# Patient Record
Sex: FEMALE | Race: WHITE | NOT HISPANIC OR LATINO
[De-identification: names, ages, dates, MRNs, and addresses within clinical notes are randomized per-mention and may not be internally consistent; named-entity substitution may affect disease eponyms.]

---

## 2017-04-26 ENCOUNTER — APPOINTMENT (OUTPATIENT)
Dept: ENDOCRINOLOGY | Facility: CLINIC | Age: 72
End: 2017-04-26

## 2017-04-26 VITALS — SYSTOLIC BLOOD PRESSURE: 110 MMHG | OXYGEN SATURATION: 98 % | HEART RATE: 74 BPM | DIASTOLIC BLOOD PRESSURE: 64 MMHG

## 2017-07-17 ENCOUNTER — APPOINTMENT (OUTPATIENT)
Dept: ENDOCRINOLOGY | Facility: CLINIC | Age: 72
End: 2017-07-17

## 2017-07-17 VITALS
HEIGHT: 60 IN | HEART RATE: 83 BPM | WEIGHT: 128 LBS | DIASTOLIC BLOOD PRESSURE: 72 MMHG | BODY MASS INDEX: 25.13 KG/M2 | SYSTOLIC BLOOD PRESSURE: 118 MMHG | OXYGEN SATURATION: 98 %

## 2017-11-13 ENCOUNTER — APPOINTMENT (OUTPATIENT)
Dept: OBGYN | Facility: CLINIC | Age: 72
End: 2017-11-13
Payer: MEDICARE

## 2017-11-13 ENCOUNTER — RESULT REVIEW (OUTPATIENT)
Age: 72
End: 2017-11-13

## 2017-11-13 PROCEDURE — G0101: CPT

## 2018-04-27 ENCOUNTER — APPOINTMENT (OUTPATIENT)
Dept: ENDOCRINOLOGY | Facility: CLINIC | Age: 73
End: 2018-04-27
Payer: MEDICARE

## 2018-04-27 VITALS
WEIGHT: 126 LBS | HEART RATE: 74 BPM | BODY MASS INDEX: 24.1 KG/M2 | HEIGHT: 60.5 IN | DIASTOLIC BLOOD PRESSURE: 64 MMHG | SYSTOLIC BLOOD PRESSURE: 90 MMHG | OXYGEN SATURATION: 98 %

## 2018-04-27 PROCEDURE — ZZZZZ: CPT

## 2018-04-27 PROCEDURE — 99214 OFFICE O/P EST MOD 30 MIN: CPT | Mod: 25

## 2018-04-27 PROCEDURE — 77080 DXA BONE DENSITY AXIAL: CPT | Mod: GA

## 2018-04-27 RX ORDER — RANITIDINE HYDROCHLORIDE 300 MG/1
300 CAPSULE ORAL
Qty: 30 | Refills: 0 | Status: COMPLETED | COMMUNITY
Start: 2018-02-09

## 2018-05-23 ENCOUNTER — APPOINTMENT (OUTPATIENT)
Dept: RHEUMATOLOGY | Facility: CLINIC | Age: 73
End: 2018-05-23
Payer: MEDICARE

## 2018-05-23 PROCEDURE — 96374 THER/PROPH/DIAG INJ IV PUSH: CPT

## 2018-11-20 ENCOUNTER — APPOINTMENT (OUTPATIENT)
Dept: OBGYN | Facility: CLINIC | Age: 73
End: 2018-11-20
Payer: MEDICARE

## 2018-11-20 PROCEDURE — 99213 OFFICE O/P EST LOW 20 MIN: CPT

## 2019-04-29 ENCOUNTER — APPOINTMENT (OUTPATIENT)
Dept: ENDOCRINOLOGY | Facility: CLINIC | Age: 74
End: 2019-04-29
Payer: MEDICARE

## 2019-04-29 VITALS
OXYGEN SATURATION: 98 % | SYSTOLIC BLOOD PRESSURE: 106 MMHG | BODY MASS INDEX: 24.8 KG/M2 | HEIGHT: 60.25 IN | HEART RATE: 69 BPM | DIASTOLIC BLOOD PRESSURE: 66 MMHG | WEIGHT: 128 LBS

## 2019-04-29 PROCEDURE — 99214 OFFICE O/P EST MOD 30 MIN: CPT | Mod: 25

## 2019-04-29 PROCEDURE — 77080 DXA BONE DENSITY AXIAL: CPT | Mod: GA

## 2019-04-29 PROCEDURE — ZZZZZ: CPT

## 2019-04-29 NOTE — REVIEW OF SYSTEMS
[Heartburn] : heartburn [Negative] : Heme/Lymph [All other systems negative] : All other systems negative [Palpitations] : palpitations [Shortness Of Breath] : shortness of breath

## 2019-04-30 NOTE — ASSESSMENT
[FreeTextEntry1] : 74 y/o female with osteoporosis. \par \par Pt had progressive bone loss on 5 year drug holiday from Actonel. Pt was restarted treatment on IV Reclast due to severe GERD (first in 3/2016). Pt tolerated this well. No ONJ. No thigh pain. No interval fx. BMD 2018 decreased fem neck. Options of medical therapy were discussed in great detail. Had another dose of Reclast May 2018. BMD 4/2019 indicates stability in all sites, fem neck consistent with osteoporosis.\par \par I recommend pt have another dose of Reclast and consider a drug holiday after this. Recent research with Reclast looked at 18 month cycles of dosing instead of 12 months, with 6 year safety data. All questions answered. Referred to rheum. \par \par Of note, pt had cough and cold took Cefdinir 300 mg on own. Pt then felt palpations, trouble breathing, and tightness in chest. Normal echo, carotid US, halter, EKG. If she wishes to do further w/u she can see pulm. I do not see value in second cardiology w/u. Recommended Dr. Dioni Chin. \par Pt will have labs done in 2 weeks. \par \par f/u in 1 year.  [Bisphosphonate Therapy] : Risks  and benefits of bisphosphonate therapy were  discussed with the patient including gastroesophageal irritation, osteonecrosis of the jaw, and atypical femur fractures, and acute phase reaction

## 2019-04-30 NOTE — PROCEDURE
[FreeTextEntry1] : Bone mineral density: 04/29/2019 \par Indication: vs. 2018, assess response to medication \par Spine: not done\par Total hip: -2.1 osteopenia, no significant change \par Femoral neck: -3.0 osteoporosis, no significant change \par Proximal radius: -2.4 osteopenia, no significant change \par \par Bone mineral density 4/27/18\par indication: assess response to medication \par spine Not accurate because of arthritis \par total hip -2.0 osteopenia, no significant change \par femoral neck -2.9 osteoporosis -9.2%\par proximal radius  -2.2 osteopenia, no significant change

## 2019-04-30 NOTE — HISTORY OF PRESENT ILLNESS
[Risedronate (Actonel)] : Risedronate [Zoledronic Acid (Zometa)] : Zoledronic Acid [Patient taking Meds Correctly] : Patient is taking meds correctly [Family History of Osteoporosis] : family history of osteoporosis [Family History of Hip Fracture] : family history of hip fracture [Regular Dental Follow-Up] : regular dental follow-up [FreeTextEntry1] : f/u 72 y/o female with osteoporosis. \par \par Pt was treated with Actonel for about 10 years and stopped 2011 for a drug holiday. BMD 4/2017 showed spine -1.9, tot hip -2.0, and fem neck -2.4, which improved from prior. The pt has not had any fx but does have a h/o maternal hip fx. She has active GERD and prior gastric surgery and is unwilling to restart standard oral agents such as Actonel. Had EGD. s/p 2 doses of Reclast 3/2016 and 2018. Tolerated well. No interval fxs. Last DDS within 6 mons. No ONJ. BMD 2018 sl decrease in fem neck put stable in other sites. \par  \par \par  [Disordered Eating] : no past or present history of disordered eating [Taking Steroids] : no past or present history of taking steroids [Kidney Stones] : no history of kidney stones [High Fall Risk] : no fall risk [Family History of Breast Cancer] : no family history of breast cancer [Hyperparathyroidism] : no hyperparathyroidism [History of Radiation Therapy] : no history of radiation therapy [History of Blood Clots] : no history of blood clots [Previous Fragility Fracture] : no previous fragility fracture

## 2019-04-30 NOTE — END OF VISIT
[FreeTextEntry3] : I, Sylvia Garcia, authored this note working as a medical scribe for Dr. Latham.  04/29/2019. 11:30AM. \par This note was authored by Sylvia Garcia working as medical scribe for me. I have reviewed, edited, and revised the note as needed. I am in agreement with the exam findings, imaging findings, and treatment plan.  Maurice Latham MD

## 2019-04-30 NOTE — PHYSICAL EXAM
[Alert] : alert [No Acute Distress] : no acute distress [Well Nourished] : well nourished [Well Developed] : well developed [Normal Sclera/Conjunctiva] : normal sclera/conjunctiva [EOMI] : extra ocular movement intact [No Proptosis] : no proptosis [Normal Oropharynx] : the oropharynx was normal [Thyroid Not Enlarged] : the thyroid was not enlarged [No Thyroid Nodules] : there were no palpable thyroid nodules [No Respiratory Distress] : no respiratory distress [No Accessory Muscle Use] : no accessory muscle use [Clear to Auscultation] : lungs were clear to auscultation bilaterally [Normal Rate] : heart rate was normal  [Normal S1, S2] : normal S1 and S2 [Regular Rhythm] : with a regular rhythm [Normal Bowel Sounds] : normal bowel sounds [Not Tender] : non-tender [Soft] : abdomen soft [Not Distended] : not distended [Anterior Cervical Nodes] : anterior cervical nodes [Normal] : normal and non tender [No Spinal Tenderness] : no spinal tenderness [Spine Straight] : spine straight [No Stigmata of Cushings Syndrome] : no stigmata of cushings syndrome [Normal Gait] : normal gait [Normal Strength/Tone] : muscle strength and tone were normal [No Rash] : no rash [Normal Reflexes] : deep tendon reflexes were 2+ and symmetric [No Tremors] : no tremors [Oriented x3] : oriented to person, place, and time [Acanthosis Nigricans] : no acanthosis nigricans [de-identified] : no tenderness of spine

## 2019-07-24 ENCOUNTER — APPOINTMENT (OUTPATIENT)
Dept: RHEUMATOLOGY | Facility: CLINIC | Age: 74
End: 2019-07-24
Payer: MEDICARE

## 2019-07-24 PROCEDURE — 96374 THER/PROPH/DIAG INJ IV PUSH: CPT

## 2019-08-12 ENCOUNTER — APPOINTMENT (OUTPATIENT)
Dept: PULMONOLOGY | Facility: CLINIC | Age: 74
End: 2019-08-12
Payer: MEDICARE

## 2019-08-12 VITALS
SYSTOLIC BLOOD PRESSURE: 125 MMHG | HEIGHT: 60 IN | DIASTOLIC BLOOD PRESSURE: 66 MMHG | BODY MASS INDEX: 25.13 KG/M2 | OXYGEN SATURATION: 100 % | HEART RATE: 67 BPM | WEIGHT: 128 LBS | RESPIRATION RATE: 18 BRPM

## 2019-08-12 DIAGNOSIS — J32.9 CHRONIC SINUSITIS, UNSPECIFIED: ICD-10-CM

## 2019-08-12 DIAGNOSIS — K21.9 GASTRO-ESOPHAGEAL REFLUX DISEASE W/OUT ESOPHAGITIS: ICD-10-CM

## 2019-08-12 PROCEDURE — 99204 OFFICE O/P NEW MOD 45 MIN: CPT | Mod: 25

## 2019-08-12 PROCEDURE — 71046 X-RAY EXAM CHEST 2 VIEWS: CPT

## 2019-08-12 PROCEDURE — 95012 NITRIC OXIDE EXP GAS DETER: CPT

## 2019-08-12 PROCEDURE — 94729 DIFFUSING CAPACITY: CPT

## 2019-08-12 PROCEDURE — 94727 GAS DIL/WSHOT DETER LNG VOL: CPT

## 2019-08-12 PROCEDURE — 94060 EVALUATION OF WHEEZING: CPT

## 2019-08-12 RX ORDER — ZOLEDRONIC ACID 5 MG/100ML
5 INJECTION, SOLUTION INTRAVENOUS
Qty: 1 | Refills: 0 | Status: DISCONTINUED | COMMUNITY
Start: 2019-04-29 | End: 2019-08-12

## 2019-08-13 NOTE — PHYSICAL EXAM
[General Appearance - Well Developed] : well developed [Normal Appearance] : normal appearance [Well Groomed] : well groomed [General Appearance - Well Nourished] : well nourished [No Deformities] : no deformities [General Appearance - In No Acute Distress] : no acute distress [Normal Conjunctiva] : the conjunctiva exhibited no abnormalities [Eyelids - No Xanthelasma] : the eyelids demonstrated no xanthelasmas [Neck Appearance] : the appearance of the neck was normal [Normal Oropharynx] : normal oropharynx [Neck Cervical Mass (___cm)] : no neck mass was observed [Jugular Venous Distention Increased] : there was no jugular-venous distention [Thyroid Diffuse Enlargement] : the thyroid was not enlarged [Thyroid Nodule] : there were no palpable thyroid nodules [Heart Rate And Rhythm] : heart rate and rhythm were normal [Heart Sounds] : normal S1 and S2 [Murmurs] : no murmurs present [Respiration, Rhythm And Depth] : normal respiratory rhythm and effort [Exaggerated Use Of Accessory Muscles For Inspiration] : no accessory muscle use [Auscultation Breath Sounds / Voice Sounds] : lungs were clear to auscultation bilaterally [Abdomen Soft] : soft [Abdomen Tenderness] : non-tender [Abdomen Mass (___ Cm)] : no abdominal mass palpated [Abnormal Walk] : normal gait [Gait - Sufficient For Exercise Testing] : the gait was sufficient for exercise testing [Nail Clubbing] : no clubbing of the fingernails [Cyanosis, Localized] : no localized cyanosis [Petechial Hemorrhages (___cm)] : no petechial hemorrhages [Skin Color & Pigmentation] : normal skin color and pigmentation [Skin Turgor] : normal skin turgor [] : no rash [Deep Tendon Reflexes (DTR)] : deep tendon reflexes were 2+ and symmetric [Sensation] : the sensory exam was normal to light touch and pinprick [No Focal Deficits] : no focal deficits [Oriented To Time, Place, And Person] : oriented to person, place, and time [Impaired Insight] : insight and judgment were intact [Affect] : the affect was normal

## 2019-08-13 NOTE — HISTORY OF PRESENT ILLNESS
[FreeTextEntry1] : Patient here for evaluation of underlying lung disease. Reports symptoms of prolonged cough and sinus congestion. Took a course of antibiotics with improvement. Subsequently developed palpitations and difficulty catching her breath had cardiac workup there was negative. Has history of intermittent acid reflux. Had a hiatal hernia. Underwent laparoscopic repair. Incomplete resolution of symptoms. 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 On chronic vasodilator therapy\par \par \par \par \par

## 2019-08-13 NOTE — ASSESSMENT
[FreeTextEntry1] : No objective evidence of active pulmonary disease.\par \par Reassessment when symptomatic

## 2019-11-22 ENCOUNTER — APPOINTMENT (OUTPATIENT)
Dept: OBGYN | Facility: CLINIC | Age: 74
End: 2019-11-22
Payer: MEDICARE

## 2019-11-22 ENCOUNTER — RESULT REVIEW (OUTPATIENT)
Age: 74
End: 2019-11-22

## 2019-11-22 PROCEDURE — G0101: CPT

## 2019-12-09 ENCOUNTER — FORM ENCOUNTER (OUTPATIENT)
Age: 74
End: 2019-12-09

## 2019-12-10 ENCOUNTER — APPOINTMENT (OUTPATIENT)
Dept: PULMONOLOGY | Facility: CLINIC | Age: 74
End: 2019-12-10
Payer: MEDICARE

## 2019-12-10 VITALS
HEART RATE: 80 BPM | SYSTOLIC BLOOD PRESSURE: 119 MMHG | OXYGEN SATURATION: 100 % | TEMPERATURE: 97.5 F | DIASTOLIC BLOOD PRESSURE: 73 MMHG

## 2019-12-10 PROCEDURE — 99213 OFFICE O/P EST LOW 20 MIN: CPT | Mod: 25

## 2019-12-10 PROCEDURE — 94060 EVALUATION OF WHEEZING: CPT

## 2019-12-10 PROCEDURE — 95012 NITRIC OXIDE EXP GAS DETER: CPT

## 2019-12-10 NOTE — HISTORY OF PRESENT ILLNESS
[FreeTextEntry1] : Overall feeling good. Occasional SOB. Denies coughing/wheezing. Having a difficult time catching he breath sometimes. \par Had cardiac workup negative\par

## 2019-12-10 NOTE — PHYSICAL EXAM
[General Appearance - Well Developed] : well developed [Normal Appearance] : normal appearance [General Appearance - Well Nourished] : well nourished [Well Groomed] : well groomed [No Deformities] : no deformities [General Appearance - In No Acute Distress] : no acute distress [Normal Conjunctiva] : the conjunctiva exhibited no abnormalities [Eyelids - No Xanthelasma] : the eyelids demonstrated no xanthelasmas [Normal Oropharynx] : normal oropharynx [Neck Appearance] : the appearance of the neck was normal [Neck Cervical Mass (___cm)] : no neck mass was observed [Jugular Venous Distention Increased] : there was no jugular-venous distention [Thyroid Diffuse Enlargement] : the thyroid was not enlarged [Thyroid Nodule] : there were no palpable thyroid nodules [Heart Rate And Rhythm] : heart rate and rhythm were normal [Heart Sounds] : normal S1 and S2 [Murmurs] : no murmurs present [Respiration, Rhythm And Depth] : normal respiratory rhythm and effort [Exaggerated Use Of Accessory Muscles For Inspiration] : no accessory muscle use [Auscultation Breath Sounds / Voice Sounds] : lungs were clear to auscultation bilaterally [Abdomen Soft] : soft [Abdomen Tenderness] : non-tender [Abdomen Mass (___ Cm)] : no abdominal mass palpated [Abnormal Walk] : normal gait [Gait - Sufficient For Exercise Testing] : the gait was sufficient for exercise testing [Cyanosis, Localized] : no localized cyanosis [Nail Clubbing] : no clubbing of the fingernails [Petechial Hemorrhages (___cm)] : no petechial hemorrhages [Deep Tendon Reflexes (DTR)] : deep tendon reflexes were 2+ and symmetric [] : no ischemic changes [Sensation] : the sensory exam was normal to light touch and pinprick [No Focal Deficits] : no focal deficits [Oriented To Time, Place, And Person] : oriented to person, place, and time [Affect] : the affect was normal [Impaired Insight] : insight and judgment were intact

## 2019-12-17 ENCOUNTER — APPOINTMENT (OUTPATIENT)
Dept: PULMONOLOGY | Facility: CLINIC | Age: 74
End: 2019-12-17
Payer: MEDICARE

## 2019-12-17 ENCOUNTER — FORM ENCOUNTER (OUTPATIENT)
Age: 74
End: 2019-12-17

## 2019-12-17 PROCEDURE — 95800 SLP STDY UNATTENDED: CPT

## 2019-12-18 PROCEDURE — 95800 SLP STDY UNATTENDED: CPT

## 2020-01-06 ENCOUNTER — APPOINTMENT (OUTPATIENT)
Dept: PULMONOLOGY | Facility: CLINIC | Age: 75
End: 2020-01-06
Payer: MEDICARE

## 2020-01-06 VITALS
SYSTOLIC BLOOD PRESSURE: 110 MMHG | RESPIRATION RATE: 12 BRPM | DIASTOLIC BLOOD PRESSURE: 71 MMHG | TEMPERATURE: 98.2 F | HEART RATE: 78 BPM | OXYGEN SATURATION: 99 %

## 2020-01-06 DIAGNOSIS — G47.33 OBSTRUCTIVE SLEEP APNEA (ADULT) (PEDIATRIC): ICD-10-CM

## 2020-01-06 DIAGNOSIS — R06.00 DYSPNEA, UNSPECIFIED: ICD-10-CM

## 2020-01-06 PROCEDURE — 99214 OFFICE O/P EST MOD 30 MIN: CPT

## 2020-01-06 NOTE — PHYSICAL EXAM
[General Appearance - Well Developed] : well developed [Normal Appearance] : normal appearance [Well Groomed] : well groomed [General Appearance - Well Nourished] : well nourished [No Deformities] : no deformities [General Appearance - In No Acute Distress] : no acute distress [Normal Conjunctiva] : the conjunctiva exhibited no abnormalities [Eyelids - No Xanthelasma] : the eyelids demonstrated no xanthelasmas [Normal Oropharynx] : normal oropharynx [Neck Appearance] : the appearance of the neck was normal [Neck Cervical Mass (___cm)] : no neck mass was observed [Thyroid Diffuse Enlargement] : the thyroid was not enlarged [Jugular Venous Distention Increased] : there was no jugular-venous distention [Thyroid Nodule] : there were no palpable thyroid nodules [Heart Sounds] : normal S1 and S2 [Heart Rate And Rhythm] : heart rate and rhythm were normal [Murmurs] : no murmurs present [Respiration, Rhythm And Depth] : normal respiratory rhythm and effort [Exaggerated Use Of Accessory Muscles For Inspiration] : no accessory muscle use [Auscultation Breath Sounds / Voice Sounds] : lungs were clear to auscultation bilaterally [Abdomen Soft] : soft [Abdomen Tenderness] : non-tender [Abnormal Walk] : normal gait [Abdomen Mass (___ Cm)] : no abdominal mass palpated [Nail Clubbing] : no clubbing of the fingernails [Gait - Sufficient For Exercise Testing] : the gait was sufficient for exercise testing [Cyanosis, Localized] : no localized cyanosis [Petechial Hemorrhages (___cm)] : no petechial hemorrhages [] : no ischemic changes [Deep Tendon Reflexes (DTR)] : deep tendon reflexes were 2+ and symmetric [Sensation] : the sensory exam was normal to light touch and pinprick [No Focal Deficits] : no focal deficits [Oriented To Time, Place, And Person] : oriented to person, place, and time [Impaired Insight] : insight and judgment were intact [Affect] : the affect was normal

## 2020-01-06 NOTE — ASSESSMENT
[FreeTextEntry1] : Continued shortness of breath unclear etiology\par Recommend ENT evaluation for nasal obstruction\par Start Singulair\par Patient declining treatment for sleep apnea given the mild nature of finding a sleep study this is reasonable

## 2020-01-06 NOTE — HISTORY OF PRESENT ILLNESS
[FreeTextEntry1] : Followup for shortness of breath and sleep study\par underwent home sleep study here to review results\par Continues to have shortness of breath with exertion on an intermittent basis

## 2020-06-22 ENCOUNTER — APPOINTMENT (OUTPATIENT)
Dept: ENDOCRINOLOGY | Facility: CLINIC | Age: 75
End: 2020-06-22
Payer: MEDICARE

## 2020-06-22 VITALS
SYSTOLIC BLOOD PRESSURE: 100 MMHG | OXYGEN SATURATION: 97 % | TEMPERATURE: 97.9 F | BODY MASS INDEX: 24.99 KG/M2 | DIASTOLIC BLOOD PRESSURE: 52 MMHG | WEIGHT: 129 LBS | HEIGHT: 60.3 IN | HEART RATE: 77 BPM

## 2020-06-22 PROCEDURE — ZZZZZ: CPT

## 2020-06-22 PROCEDURE — 77080 DXA BONE DENSITY AXIAL: CPT | Mod: GA

## 2020-06-22 PROCEDURE — 99213 OFFICE O/P EST LOW 20 MIN: CPT | Mod: 25

## 2020-06-22 RX ORDER — MONTELUKAST 10 MG/1
10 TABLET, FILM COATED ORAL
Qty: 30 | Refills: 1 | Status: DISCONTINUED | COMMUNITY
Start: 2020-01-06 | End: 2020-06-22

## 2020-06-22 NOTE — PHYSICAL EXAM
[Alert] : alert [No Acute Distress] : no acute distress [Well Nourished] : well nourished [Normal Sclera/Conjunctiva] : normal sclera/conjunctiva [Well Developed] : well developed [EOMI] : extra ocular movement intact [Thyroid Not Enlarged] : the thyroid was not enlarged [Normal Oropharynx] : the oropharynx was normal [No Proptosis] : no proptosis [No Accessory Muscle Use] : no accessory muscle use [No Respiratory Distress] : no respiratory distress [No Thyroid Nodules] : no palpable thyroid nodules [Clear to Auscultation] : lungs were clear to auscultation bilaterally [Normal Rate] : heart rate was normal [Normal S1, S2] : normal S1 and S2 [Regular Rhythm] : with a regular rhythm [No Edema] : no peripheral edema [Pedal Pulses Normal] : the pedal pulses are present [Normal Bowel Sounds] : normal bowel sounds [Not Distended] : not distended [Not Tender] : non-tender [Soft] : abdomen soft [Normal Anterior Cervical Nodes] : no anterior cervical lymphadenopathy [No Spinal Tenderness] : no spinal tenderness [Normal Posterior Cervical Nodes] : no posterior cervical lymphadenopathy [No Stigmata of Cushings Syndrome] : no stigmata of Cushings Syndrome [Normal Gait] : normal gait [Spine Straight] : spine straight [No Rash] : no rash [Normal Strength/Tone] : muscle strength and tone were normal [Normal Reflexes] : deep tendon reflexes were 2+ and symmetric [Oriented x3] : oriented to person, place, and time [No Tremors] : no tremors [Acanthosis Nigricans] : no acanthosis nigricans

## 2020-06-22 NOTE — HISTORY OF PRESENT ILLNESS
[Risedronate (Actonel)] : Risedronate [Zoledronic Acid (Zometa)] : Zoledronic Acid [Patient taking Meds Correctly] : Patient is taking meds correctly [Family History of Osteoporosis] : family history of osteoporosis [Family History of Hip Fracture] : family history of hip fracture [Regular Dental Follow-Up] : regular dental follow-up [FreeTextEntry1] :  \par Pt was treated with Actonel for about 10 years and stopped 2011 for a drug holiday. BMD 4/2017 showed spine -1.9, tot hip -2.0, and fem neck -2.4, which improved from prior. The pt has not had any fx but does have a h/o maternal hip fx. She has active GERD and prior gastric surgery and is unwilling to restart standard oral agents such as Actonel. Had EGD. s/p 2 doses of Reclast 3/2016 and 2018, 7/2019. Tolerated well. No interval fxs. Last DDS within 6 mons. No ONJ.  \par  \par \par  [Kidney Stones] : no history of kidney stones [Disordered Eating] : no past or present history of disordered eating [Taking Steroids] : no past or present history of taking steroids [High Fall Risk] : no fall risk [Family History of Breast Cancer] : no family history of breast cancer [Hyperparathyroidism] : no hyperparathyroidism [History of Blood Clots] : no history of blood clots [Previous Fragility Fracture] : no previous fragility fracture [History of Radiation Therapy] : no history of radiation therapy

## 2020-06-22 NOTE — ASSESSMENT
[Bisphosphonate Therapy] : Risks  and benefits of bisphosphonate therapy were  discussed with the patient including gastroesophageal irritation, osteonecrosis of the jaw, and atypical femur fractures, and acute phase reaction [FreeTextEntry1] : 75 y/o female with osteoporosis. \par \par Pt had progressive bone loss on 5 year drug holiday from Actonel. Pt was restarted treatment on IV Reclast due to severe GERD (first in 3/2016) the patient had Reclast 2018 and 2019 as well.  Tolerated well no interval fractures.\par BMD 2020 improved femoral neck stable total hip\par \par Data  with Reclast 3 yr vs 6 yr tx reviewed. \par Recommend delaying next dose of Reclast\par Follow-up 1 year\par \par

## 2020-06-22 NOTE — PROCEDURE
[FreeTextEntry1] : Bone mineral density June 22, 2020\par Compared to 2019, assess response to intravenous medication\par Spine not performed\par Total hip -2.2 osteopenia no significant change\par Femoral neck -2.5 osteoporosis +11.9%\par Proximal radius -2.2 osteopenia no significant change\par \par Bone mineral density: 04/29/2019 \par Indication: vs. 2018, assess response to medication \par Spine: not done\par Total hip: -2.1 osteopenia, no significant change \par Femoral neck: -3.0 osteoporosis, no significant change \par Proximal radius: -2.4 osteopenia, no significant change \par \par Bone mineral density 4/27/18\par indication: assess response to medication \par spine Not accurate because of arthritis \par total hip -2.0 osteopenia, no significant change \par femoral neck -2.9 osteoporosis -9.2%\par proximal radius  -2.2 osteopenia, no significant change

## 2020-12-31 ENCOUNTER — TRANSCRIPTION ENCOUNTER (OUTPATIENT)
Age: 75
End: 2020-12-31

## 2021-01-07 ENCOUNTER — APPOINTMENT (OUTPATIENT)
Dept: MAMMOGRAPHY | Facility: CLINIC | Age: 76
End: 2021-01-07
Payer: MEDICARE

## 2021-01-07 ENCOUNTER — OUTPATIENT (OUTPATIENT)
Dept: OUTPATIENT SERVICES | Facility: HOSPITAL | Age: 76
LOS: 1 days | End: 2021-01-07
Payer: MEDICARE

## 2021-01-07 ENCOUNTER — APPOINTMENT (OUTPATIENT)
Dept: ULTRASOUND IMAGING | Facility: CLINIC | Age: 76
End: 2021-01-07
Payer: MEDICARE

## 2021-01-07 DIAGNOSIS — Z00.00 ENCOUNTER FOR GENERAL ADULT MEDICAL EXAMINATION WITHOUT ABNORMAL FINDINGS: ICD-10-CM

## 2021-01-07 PROCEDURE — 76641 ULTRASOUND BREAST COMPLETE: CPT | Mod: 26,50

## 2021-01-07 PROCEDURE — 77066 DX MAMMO INCL CAD BI: CPT | Mod: 26

## 2021-01-07 PROCEDURE — 77063 BREAST TOMOSYNTHESIS BI: CPT

## 2021-01-07 PROCEDURE — 77067 SCR MAMMO BI INCL CAD: CPT

## 2021-01-07 PROCEDURE — G0279: CPT | Mod: 26

## 2021-01-07 PROCEDURE — 77066 DX MAMMO INCL CAD BI: CPT

## 2021-01-07 PROCEDURE — 76641 ULTRASOUND BREAST COMPLETE: CPT

## 2021-01-07 PROCEDURE — G0279: CPT

## 2021-01-08 ENCOUNTER — APPOINTMENT (OUTPATIENT)
Dept: OBGYN | Facility: CLINIC | Age: 76
End: 2021-01-08

## 2021-01-12 ENCOUNTER — APPOINTMENT (OUTPATIENT)
Dept: PULMONOLOGY | Facility: CLINIC | Age: 76
End: 2021-01-12

## 2021-02-05 ENCOUNTER — APPOINTMENT (OUTPATIENT)
Dept: PULMONOLOGY | Facility: CLINIC | Age: 76
End: 2021-02-05

## 2021-03-12 ENCOUNTER — NON-APPOINTMENT (OUTPATIENT)
Age: 76
End: 2021-03-12

## 2021-07-13 PROBLEM — Z00.00 ENCOUNTER FOR PREVENTIVE HEALTH EXAMINATION: Noted: 2021-07-13

## 2021-07-14 ENCOUNTER — APPOINTMENT (OUTPATIENT)
Dept: ENDOCRINOLOGY | Facility: CLINIC | Age: 76
End: 2021-07-14
Payer: MEDICARE

## 2021-07-14 VITALS
BODY MASS INDEX: 24.54 KG/M2 | HEIGHT: 60 IN | HEART RATE: 70 BPM | DIASTOLIC BLOOD PRESSURE: 72 MMHG | WEIGHT: 125 LBS | SYSTOLIC BLOOD PRESSURE: 124 MMHG | OXYGEN SATURATION: 96 % | TEMPERATURE: 98 F

## 2021-07-14 PROCEDURE — 77080 DXA BONE DENSITY AXIAL: CPT | Mod: GA

## 2021-07-14 PROCEDURE — 99213 OFFICE O/P EST LOW 20 MIN: CPT | Mod: 25

## 2021-07-14 PROCEDURE — ZZZZZ: CPT

## 2021-07-15 NOTE — PROCEDURE
[FreeTextEntry1] : Bone mineral density: 07/14/2021\par indication: Compared to 2020 assess response to medication prior test showed bone loss\par spine not performed\par total hip -2.3 osteopenia no significant change\par femoral neck -2.8 osteoporosis -5.1% although still improved versus 2019\par proximal radius -2.5 osteoporosis no significant change\par \par Bone mineral density June 22, 2020\par Compared to 2019, assess response to intravenous medication\par Spine not performed\par Total hip -2.2 osteopenia no significant change\par Femoral neck -2.5 osteoporosis +11.9%\par Proximal radius -2.2 osteopenia no significant change\par \par Bone mineral density: 04/29/2019 \par Indication: vs. 2018, assess response to medication \par Spine: not done\par Total hip: -2.1 osteopenia, no significant change \par Femoral neck: -3.0 osteoporosis, no significant change \par Proximal radius: -2.4 osteopenia, no significant change \par \par Bone mineral density 4/27/18\par indication: assess response to medication \par spine Not accurate because of arthritis \par total hip -2.0 osteopenia, no significant change \par femoral neck -2.9 osteoporosis -9.2%\par proximal radius  -2.2 osteopenia, no significant change

## 2021-07-15 NOTE — HISTORY OF PRESENT ILLNESS
[Risedronate (Actonel)] : Risedronate [Zoledronic Acid (Zometa)] : Zoledronic Acid [Patient taking Meds Correctly] : Patient is taking meds correctly [Family History of Osteoporosis] : family history of osteoporosis [Family History of Hip Fracture] : family history of hip fracture [Regular Dental Follow-Up] : regular dental follow-up [FreeTextEntry1] :  No significant interval health changes.  No interval surgery, hospitalizations, fractures, or change in medications. \par Pt was treated with Actonel for about 10 years and stopped 2011 for a drug holiday. BMD 4/2017 showed spine -1.9, tot hip -2.0, and fem neck -2.4, which improved from prior. The pt has not had any fx but does have a h/o maternal hip fx. She has active GERD and prior gastric surgery and is unwilling to restart standard oral agents such as Actonel. Had EGD. s/p  f Reclast 3/2016 and 2018, 7/2019. Tolerated well. No interval fxs. Last DDS within 6 mons. No ONJ.  \par  \par \par  [Disordered Eating] : no past or present history of disordered eating [Taking Steroids] : no past or present history of taking steroids [Kidney Stones] : no history of kidney stones [High Fall Risk] : no fall risk [Family History of Breast Cancer] : no family history of breast cancer [Hyperparathyroidism] : no hyperparathyroidism [History of Radiation Therapy] : no history of radiation therapy [History of Blood Clots] : no history of blood clots [Previous Fragility Fracture] : no previous fragility fracture

## 2021-07-15 NOTE — ASSESSMENT
[FreeTextEntry1] : 74y/o female with osteoporosis. \par \par Pt had progressive bone loss on 5 year drug holiday from Actonel. Pt was restarted treatment on IV Reclast due to severe GERD (first in 3/2016) the patient had Reclast 2018 and 2019 as well.  Tolerated well no interval fractures.\par BMD 2020 improved femoral neck stable total hip\par Mineral density 2021 shows stable total hip and trochanter.  Femoral neck shows decline versus 2020 but this appears to be regression to the mean as the 2020 value was inconsistent with other data points.  Options of therapy reviewed.  We will check a marker of bone activity if this is high recommend another dose of Reclast versus considering Prolia.\par Data  with Reclast 3 yr vs 6 yr tx reviewed. \par  \par \par   [Bisphosphonate Therapy] : Risks and benefits of bisphosphonate therapy were  discussed with the patient including gastroesophageal irritation, osteonecrosis of the jaw, and atypical femur fractures, and acute phase reaction

## 2021-07-19 LAB — COLLAGEN CTX SERPL-MCNC: 395 PG/ML

## 2021-07-27 RX ORDER — ZOLEDRONIC ACID 5 MG/100ML
5 INJECTION INTRAVENOUS
Qty: 1 | Refills: 0 | Status: ACTIVE | COMMUNITY
Start: 2018-04-27

## 2021-09-08 ENCOUNTER — APPOINTMENT (OUTPATIENT)
Dept: RHEUMATOLOGY | Facility: CLINIC | Age: 76
End: 2021-09-08
Payer: MEDICARE

## 2021-09-08 PROCEDURE — 96374 THER/PROPH/DIAG INJ IV PUSH: CPT

## 2022-01-05 ENCOUNTER — TRANSCRIPTION ENCOUNTER (OUTPATIENT)
Age: 77
End: 2022-01-05

## 2022-07-15 ENCOUNTER — APPOINTMENT (OUTPATIENT)
Dept: ENDOCRINOLOGY | Facility: CLINIC | Age: 77
End: 2022-07-15

## 2022-07-15 VITALS
SYSTOLIC BLOOD PRESSURE: 118 MMHG | TEMPERATURE: 98.3 F | DIASTOLIC BLOOD PRESSURE: 68 MMHG | WEIGHT: 127 LBS | RESPIRATION RATE: 16 BRPM | HEART RATE: 70 BPM | OXYGEN SATURATION: 98 % | HEIGHT: 59.9 IN | BODY MASS INDEX: 24.94 KG/M2

## 2022-07-15 PROCEDURE — ZZZZZ: CPT

## 2022-07-15 PROCEDURE — 77080 DXA BONE DENSITY AXIAL: CPT

## 2022-07-15 PROCEDURE — 99213 OFFICE O/P EST LOW 20 MIN: CPT | Mod: 25

## 2022-07-15 NOTE — PROCEDURE
[FreeTextEntry1] : Bone mineral density: 07/15/2022\par indication: Compared to 2021 assess response to medication prior test showed rapid bone loss\par spine not performed\par total hip -2.2 osteopenia no significant change\par femoral neck -2.6 osteoporosis no significant change\par proximal radius -2.2 osteopenia no significant change\par \par Bone mineral density: 07/14/2021\par indication: Compared to 2020 assess response to medication prior test showed bone loss\par spine not performed\par total hip -2.3 osteopenia no significant change\par femoral neck -2.8 osteoporosis -5.1% although still improved versus 2019\par proximal radius -2.5 osteoporosis no significant change\par \par Bone mineral density June 22, 2020\par Compared to 2019, assess response to intravenous medication\par Spine not performed\par Total hip -2.2 osteopenia no significant change\par Femoral neck -2.5 osteoporosis +11.9%\par Proximal radius -2.2 osteopenia no significant change\par \par Bone mineral density: 04/29/2019 \par Indication: vs. 2018, assess response to medication \par Spine: not done\par Total hip: -2.1 osteopenia, no significant change \par Femoral neck: -3.0 osteoporosis, no significant change \par Proximal radius: -2.4 osteopenia, no significant change \par \par Bone mineral density 4/27/18\par indication: assess response to medication \par spine Not accurate because of arthritis \par total hip -2.0 osteopenia, no significant change \par femoral neck -2.9 osteoporosis -9.2%\par proximal radius  -2.2 osteopenia, no significant change

## 2022-07-15 NOTE — HISTORY OF PRESENT ILLNESS
[Risedronate (Actonel)] : Risedronate [Zoledronic Acid (Zometa)] : Zoledronic Acid [Patient taking Meds Correctly] : Patient is taking meds correctly [Family History of Osteoporosis] : family history of osteoporosis [Family History of Hip Fracture] : family history of hip fracture [Regular Dental Follow-Up] : regular dental follow-up [FreeTextEntry1] :  No significant interval health changes.  No interval surgery, hospitalizations, fractures, or change in medications. \par Pt was treated with Actonel for about 10 years and stopped 2011 for a drug holiday. BMD 4/2017 showed spine -1.9, tot hip -2.0, and fem neck -2.4, which improved from prior. The pt has not had any fx but does have a h/o maternal hip fx. She has active GERD and prior gastric surgery and is unwilling to restart standard oral agents such as Actonel. Had EGD currently on Aciphex for this.  Did not tolerate Dexilant for nonspecific reasons.\par . s/p   Reclast 3/2016 and 2018, 7/2019.\par Had repeat dose of intravenous Reclast September 2021.  no acute phase reaction.\par  Tolerated well. No interval fxs. Last DDS within 6 mons. No ONJ.  \par  \par \par  [Disordered Eating] : no past or present history of disordered eating [Taking Steroids] : no past or present history of taking steroids [Kidney Stones] : no history of kidney stones [High Fall Risk] : no fall risk [Family History of Breast Cancer] : no family history of breast cancer [Hyperparathyroidism] : no hyperparathyroidism [History of Radiation Therapy] : no history of radiation therapy [History of Blood Clots] : no history of blood clots [Previous Fragility Fracture] : no previous fragility fracture

## 2022-07-15 NOTE — ASSESSMENT
[Bisphosphonate Therapy] : Risks and benefits of bisphosphonate therapy were  discussed with the patient including gastroesophageal irritation, osteonecrosis of the jaw, and atypical femur fractures, and acute phase reaction [FreeTextEntry1] : 77 y/o female with osteoporosis. \par \par Pt had progressive bone loss on 5 year drug holiday from Actonel. Pt was restarted treatment on IV Reclast due to severe GERD 2016, 2018, 2019 and September 2021.\par Bone mineral density 2022 is essentially stable.  Options of therapy reviewed\par \par Data  with Reclast 3 yr vs 6 yr tx reviewed. \par Recommend delaying Reclast for least 1 additional year\par \par

## 2023-05-17 ENCOUNTER — NON-APPOINTMENT (OUTPATIENT)
Age: 78
End: 2023-05-17

## 2023-08-14 ENCOUNTER — APPOINTMENT (OUTPATIENT)
Dept: ENDOCRINOLOGY | Facility: CLINIC | Age: 78
End: 2023-08-14
Payer: MEDICARE

## 2023-08-14 VITALS — SYSTOLIC BLOOD PRESSURE: 132 MMHG | HEART RATE: 74 BPM | DIASTOLIC BLOOD PRESSURE: 68 MMHG | OXYGEN SATURATION: 99 %

## 2023-08-14 VITALS — BODY MASS INDEX: 24.35 KG/M2 | HEIGHT: 60 IN | WEIGHT: 124 LBS

## 2023-08-14 DIAGNOSIS — M81.0 AGE-RELATED OSTEOPOROSIS W/OUT CURRENT PATHOLOGICAL FRACTURE: ICD-10-CM

## 2023-08-14 PROCEDURE — 99213 OFFICE O/P EST LOW 20 MIN: CPT | Mod: 25

## 2023-08-14 PROCEDURE — 77080 DXA BONE DENSITY AXIAL: CPT | Mod: GA

## 2023-08-14 PROCEDURE — ZZZZZ: CPT

## 2023-08-15 RX ADMIN — ZOLEDRONIC ACID 5 MG/100ML: 5 INJECTION INTRAVENOUS at 00:00

## 2023-08-15 NOTE — ASSESSMENT
[Bisphosphonate Therapy] : Risks and benefits of bisphosphonate therapy were  discussed with the patient including gastroesophageal irritation, osteonecrosis of the jaw, and atypical femur fractures, and acute phase reaction [FreeTextEntry1] : 78y/o female with osteoporosis.   Pt had progressive bone loss on 5 year drug holiday from Actonel. Pt was restarted treatment on IV Reclast due to severe GERD 2016, 2018, 2019 and September 2021.  Bone density 2023 decreased total hip.  Femoral neck bone density decreased slightly but not statistically significant. Recommend another dose of intravenous Reclast.  Other options such as Prolia were also discussed Referred to rheumatology. Request blood test from Dr. Vicente Canales.

## 2023-08-15 NOTE — PROCEDURE
[FreeTextEntry1] : Bone mineral density: 08/14/2023 indication: Compared to 2020 to assess response to medication spine not performed total hip -2.6 osteoporosis -5.9% femoral neck -2.9 osteoporosis -4.9% of borderline significance  proximal radius  -2.4 osteopenia no significant change  Bone mineral density: 07/15/2022 indication: Compared to 2021 assess response to medication prior test showed rapid bone loss spine not performed total hip -2.2 osteopenia no significant change femoral neck -2.6 osteoporosis no significant change proximal radius -2.2 osteopenia no significant change  Bone mineral density: 07/14/2021 indication: Compared to 2020 assess response to medication prior test showed bone loss spine not performed total hip -2.3 osteopenia no significant change femoral neck -2.8 osteoporosis -5.1% although still improved versus 2019 proximal radius -2.5 osteoporosis no significant change  Bone mineral density June 22, 2020 Compared to 2019, assess response to intravenous medication Spine not performed Total hip -2.2 osteopenia no significant change Femoral neck -2.5 osteoporosis +11.9% Proximal radius -2.2 osteopenia no significant change  Bone mineral density: 04/29/2019  Indication: vs. 2018, assess response to medication  Spine: not done Total hip: -2.1 osteopenia, no significant change  Femoral neck: -3.0 osteoporosis, no significant change  Proximal radius: -2.4 osteopenia, no significant change   Bone mineral density 4/27/18 indication: assess response to medication  spine Not accurate because of arthritis  total hip -2.0 osteopenia, no significant change  femoral neck -2.9 osteoporosis -9.2% proximal radius  -2.2 osteopenia, no significant change

## 2023-10-09 ENCOUNTER — APPOINTMENT (OUTPATIENT)
Dept: RHEUMATOLOGY | Facility: CLINIC | Age: 78
End: 2023-10-09
Payer: MEDICARE

## 2023-10-09 VITALS
RESPIRATION RATE: 16 BRPM | SYSTOLIC BLOOD PRESSURE: 121 MMHG | HEIGHT: 61 IN | TEMPERATURE: 97.6 F | HEART RATE: 78 BPM | OXYGEN SATURATION: 98 % | DIASTOLIC BLOOD PRESSURE: 74 MMHG

## 2023-10-09 VITALS
DIASTOLIC BLOOD PRESSURE: 67 MMHG | SYSTOLIC BLOOD PRESSURE: 104 MMHG | HEART RATE: 82 BPM | OXYGEN SATURATION: 97 % | RESPIRATION RATE: 16 BRPM

## 2023-10-09 PROCEDURE — 96374 THER/PROPH/DIAG INJ IV PUSH: CPT

## 2023-10-09 RX ORDER — ZOLEDRONIC ACID 5 MG/100ML
5 INJECTION INTRAVENOUS
Qty: 0 | Refills: 0 | Status: COMPLETED | OUTPATIENT
Start: 2023-08-15

## 2023-11-27 ENCOUNTER — APPOINTMENT (OUTPATIENT)
Dept: PAIN MANAGEMENT | Facility: CLINIC | Age: 78
End: 2023-11-27
Payer: MEDICARE

## 2023-11-27 VITALS
HEART RATE: 66 BPM | WEIGHT: 125 LBS | BODY MASS INDEX: 23.6 KG/M2 | DIASTOLIC BLOOD PRESSURE: 76 MMHG | HEIGHT: 61 IN | SYSTOLIC BLOOD PRESSURE: 139 MMHG

## 2023-11-27 DIAGNOSIS — H93.19 TINNITUS, UNSPECIFIED EAR: ICD-10-CM

## 2023-11-27 DIAGNOSIS — G43.909 MIGRAINE, UNSPECIFIED, NOT INTRACTABLE, W/OUT STATUS MIGRAINOSUS: ICD-10-CM

## 2023-11-27 PROCEDURE — 99204 OFFICE O/P NEW MOD 45 MIN: CPT

## 2023-12-11 ENCOUNTER — APPOINTMENT (OUTPATIENT)
Dept: PAIN MANAGEMENT | Facility: CLINIC | Age: 78
End: 2023-12-11
Payer: MEDICARE

## 2023-12-11 VITALS
SYSTOLIC BLOOD PRESSURE: 138 MMHG | WEIGHT: 125 LBS | HEART RATE: 69 BPM | BODY MASS INDEX: 23.6 KG/M2 | DIASTOLIC BLOOD PRESSURE: 77 MMHG | HEIGHT: 61 IN

## 2023-12-11 PROCEDURE — 64615 CHEMODENERV MUSC MIGRAINE: CPT

## 2023-12-15 ENCOUNTER — APPOINTMENT (OUTPATIENT)
Dept: PAIN MANAGEMENT | Facility: CLINIC | Age: 78
End: 2023-12-15

## 2023-12-16 NOTE — PHYSICAL EXAM
[General Appearance - Alert] : alert [General Appearance - Well Nourished] : well nourished [General Appearance - Well Developed] : well developed [General Appearance - Well-Appearing] : healthy appearing [Oriented To Time, Place, And Person] : oriented to person, place, and time [Impaired Insight] : insight and judgment were intact [Affect] : the affect was normal [Memory Recent] : recent memory was not impaired [Memory Remote] : remote memory was not impaired [Person] : oriented to person [Place] : oriented to place [Time] : oriented to time [Short Term Intact] : short term memory intact [Remote Intact] : remote memory intact [Registration Intact] : recent registration memory intact [Concentration Intact] : normal concentrating ability [Visual Intact] : visual attention was ~T not ~L decreased [Writing A Sentence] : no difficulty writing a sentence [Fluency] : fluency intact [Comprehension] : comprehension intact [Reading] : reading intact [Current Events] : adequate knowledge of current events [Past History] : adequate knowledge of personal past history [Vocabulary] : adequate range of vocabulary [Cranial Nerves Oculomotor (III)] : extraocular motion intact [Cranial Nerves Facial (VII)] : face symmetrical [Cranial Nerves Vestibulocochlear (VIII)] : hearing was intact bilaterally [Cranial Nerves Hypoglossal (XII)] : there was no tongue deviation with protrusion [Cranial Nerves Accessory (XI - Cranial And Spinal)] : head turning and shoulder shrug symmetric [Motor Strength] : muscle strength was normal in all four extremities [No Muscle Atrophy] : normal bulk in all four extremities [Motor Strength Upper Extremities Bilaterally] : strength was normal in both upper extremities [Sensation Tactile Decrease] : light touch was intact [Allodynia] : no ~T allodynia present [Abnormal Walk] : normal gait [Tremor] : no tremor present [Dysdiadochokinesia Bilaterally] : not present [Sclera] : the sclera and conjunctiva were normal [No VERONICA] : no internuclear ophthalmoplegia [Strabismus] : no strabismus was seen [Outer Ear] : the ears and nose were normal in appearance [Hearing Threshold Finger Rub Not [place]] : hearing was normal [Neck Cervical Mass (___cm)] : no neck mass was observed [Exaggerated Use Of Accessory Muscles For Inspiration] : no accessory muscle use [Edema] : there was no peripheral edema [Nail Clubbing] : no clubbing  or cyanosis of the fingernails [Involuntary Movements] : no involuntary movements were seen [] : no rash [Skin Color & Pigmentation] : normal skin color and pigmentation

## 2023-12-16 NOTE — REVIEW OF SYSTEMS
[Fever] : no fever [Feeling Poorly] : feeling poorly [Feeling Tired] : feeling tired [Eyesight Problems] : no eyesight problems [Nasal Discharge] : no nasal discharge [Chest Pain] : no chest pain [Palpitations] : no palpitations [Cough] : no cough [As Noted in HPI] : as noted in HPI [Arthralgias] : arthralgias [Neck Pain] : neck pain [Skin Lesions] : no skin lesions [Skin Wound] : no skin wound [Itching] : no itching [Confused] : no confusion [Convulsions] : no convulsions [Dizziness] : no dizziness [Fainting] : no fainting [Sleep Disturbances] : sleep disturbances [Muscle Weakness] : no muscle weakness [Swollen Glands] : no swollen glands

## 2023-12-16 NOTE — ASSESSMENT
[FreeTextEntry1] : Discussed general headache hygiene and headache triggers referral to [facility] and [facility] sites discussed non pharmacologic care plan for repeat Botox continue pnr otc's

## 2023-12-16 NOTE — HISTORY OF PRESENT ILLNESS
[FreeTextEntry1] : Pt rtc and notes that she has been getting Botox done well for many years. Has had a long history of migraine but was treated for facial pain and vascular decompression of 7th and 8th. Had started with treatment at [facility]. had found this overall reduced her use of other meds. Was additionally receiving Botox in her masseter as well. had been most recently seeing Dr. Cunningham at [facility]. Had seen Dr. Sriniavsan initially. Had been seeing Angus Cai then at [facility]. Most recently had Botox done by another neurologist at [facility]. Has remained off of other medications because of issues she has had with medications before as well. Had used Maxalt but held off because of cardiac issues. Uses rare diclofenac. Is due Dec 11th for repeat.  [Chronic Headache] : chronic headache [Nausea] : nausea [Photophobia] : photophobia [Phonophobia] : phonophobia [Neck Pain] : neck pain [Scalp Tenderness] : scalp tenderness [> 15 days per month] : > 15 days per month [> 4 hours] : > 4 hours [Worsened] : The patient reports ~his/her~ symptoms since the last visit have worsened

## 2024-01-29 ENCOUNTER — APPOINTMENT (OUTPATIENT)
Dept: PAIN MANAGEMENT | Facility: CLINIC | Age: 79
End: 2024-01-29
Payer: MEDICARE

## 2024-01-29 PROCEDURE — 99212 OFFICE O/P EST SF 10 MIN: CPT

## 2024-01-29 NOTE — REASON FOR VISIT
[Home] : at home, [unfilled] , at the time of the visit. [Medical Office: ([facility])___] : at the medical office located in

## 2024-01-29 NOTE — PHYSICAL EXAM
[General Appearance - Alert] : alert [General Appearance - In No Acute Distress] : in no acute distress [General Appearance - Well-Appearing] : healthy appearing [] : normal voice and communication [Oriented To Time, Place, And Person] : oriented to person, place, and time [Affect] : the affect was normal [Mood] : the mood was normal [Sclera] : the sclera and conjunctiva were normal

## 2024-01-29 NOTE — HISTORY OF PRESENT ILLNESS
[FreeTextEntry1] : Pt returns today via [facility] for a follow up appt .  Pt had Botox on 12/11/23. Has had a difficult time with migraines since the injections and had neck pain for the first 2-3 weeks after the injections. Denies any further issues. Denies eyelid, eyebrow ptosis.  Pt taking prn Diclofenac.

## 2024-03-05 ENCOUNTER — APPOINTMENT (OUTPATIENT)
Dept: PAIN MANAGEMENT | Facility: CLINIC | Age: 79
End: 2024-03-05

## 2024-03-15 ENCOUNTER — APPOINTMENT (OUTPATIENT)
Dept: PAIN MANAGEMENT | Facility: CLINIC | Age: 79
End: 2024-03-15
Payer: MEDICARE

## 2024-03-15 ENCOUNTER — APPOINTMENT (OUTPATIENT)
Dept: PAIN MANAGEMENT | Facility: CLINIC | Age: 79
End: 2024-03-15

## 2024-03-15 VITALS
DIASTOLIC BLOOD PRESSURE: 81 MMHG | HEART RATE: 64 BPM | SYSTOLIC BLOOD PRESSURE: 137 MMHG | WEIGHT: 125 LBS | HEIGHT: 61 IN | BODY MASS INDEX: 23.6 KG/M2

## 2024-03-15 DIAGNOSIS — G43.701 CHRONIC MIGRAINE W/OUT AURA, NOT INTRACTABLE, WITH STATUS MIGRAINOSUS: ICD-10-CM

## 2024-03-15 DIAGNOSIS — R51.9 HEADACHE, UNSPECIFIED: ICD-10-CM

## 2024-03-15 PROCEDURE — 99213 OFFICE O/P EST LOW 20 MIN: CPT | Mod: 25

## 2024-03-15 PROCEDURE — 64615 CHEMODENERV MUSC MIGRAINE: CPT

## 2024-03-27 PROBLEM — G43.701 CHRONIC MIGRAINE WITHOUT AURA WITH STATUS MIGRAINOSUS, NOT INTRACTABLE: Status: ACTIVE | Noted: 2024-03-27

## 2024-03-27 PROBLEM — R51.9 GENERALIZED HEADACHES: Status: ACTIVE | Noted: 2023-12-16

## 2024-03-27 NOTE — REVIEW OF SYSTEMS
[Fever] : no fever [Feeling Poorly] : feeling poorly [Feeling Tired] : feeling tired [Eye Pain] : eye pain [Eyesight Problems] : no eyesight problems [Nasal Discharge] : no nasal discharge [Palpitations] : no palpitations [Shortness Of Breath] : no shortness of breath [Cough] : no cough [Constipation] : no constipation [Arthralgias] : arthralgias [Neck Pain] : neck pain [Skin Lesions] : no skin lesions [Itching] : no itching [Convulsions] : no convulsions [Dizziness] : no dizziness [Fainting] : no fainting [Sleep Disturbances] : sleep disturbances [Muscle Weakness] : no muscle weakness

## 2024-03-27 NOTE — HISTORY OF PRESENT ILLNESS
[FreeTextEntry1] : Pt notes 2 rounds of Botox with worsening of head pain for several weeks following injections. Has still had issues with neck pain, headache similar to previous. No change in associated features or in focal defixira. Is anticipating Botox today and understands the risks and benefits of the procedure.  [Chronic Headache] : chronic headache [Nausea] : nausea [Photophobia] : photophobia [Phonophobia] : phonophobia [Scalp Tenderness] : scalp tenderness [Neck Pain] : neck pain [> 15 days per month] : > 15 days per month [> 4 hours] : > 4 hours [Stable] : The patient reports ~his/her~ symptoms since the last visit are stable

## 2024-03-27 NOTE — PHYSICAL EXAM
[General Appearance - Alert] : alert [General Appearance - Well Nourished] : well nourished [General Appearance - Well Developed] : well developed [General Appearance - Well-Appearing] : healthy appearing [Oriented To Time, Place, And Person] : oriented to person, place, and time [Impaired Insight] : insight and judgment were intact [Affect] : the affect was normal [Memory Recent] : recent memory was not impaired [Memory Remote] : remote memory was not impaired [Person] : oriented to person [Place] : oriented to place [Time] : oriented to time [Short Term Intact] : short term memory intact [Remote Intact] : remote memory intact [Registration Intact] : recent registration memory intact [Concentration Intact] : normal concentrating ability [Visual Intact] : visual attention was ~T not ~L decreased [Fluency] : fluency intact [Comprehension] : comprehension intact [Current Events] : adequate knowledge of current events [Past History] : adequate knowledge of personal past history [Vocabulary] : adequate range of vocabulary [Cranial Nerves Oculomotor (III)] : extraocular motion intact [Cranial Nerves Facial (VII)] : face symmetrical [Cranial Nerves Vestibulocochlear (VIII)] : hearing was intact bilaterally [Cranial Nerves Accessory (XI - Cranial And Spinal)] : head turning and shoulder shrug symmetric [Cranial Nerves Hypoglossal (XII)] : there was no tongue deviation with protrusion [Motor Strength] : muscle strength was normal in all four extremities [No Muscle Atrophy] : normal bulk in all four extremities [Motor Strength Upper Extremities Bilaterally] : strength was normal in both upper extremities [Sensation Tactile Decrease] : light touch was intact [Allodynia] : no ~T allodynia present [Abnormal Walk] : normal gait [Tremor] : no tremor present [Dysdiadochokinesia Bilaterally] : not present [Sclera] : the sclera and conjunctiva were normal [Strabismus] : no strabismus was seen [No VERONICA] : no internuclear ophthalmoplegia [Outer Ear] : the ears and nose were normal in appearance [Hearing Threshold Finger Rub Not [place]] : hearing was normal [Neck Cervical Mass (___cm)] : no neck mass was observed [Exaggerated Use Of Accessory Muscles For Inspiration] : no accessory muscle use [Edema] : there was no peripheral edema [Nail Clubbing] : no clubbing  or cyanosis of the fingernails [Involuntary Movements] : no involuntary movements were seen [Skin Color & Pigmentation] : normal skin color and pigmentation [] : no rash

## 2024-06-12 ENCOUNTER — APPOINTMENT (OUTPATIENT)
Dept: PAIN MANAGEMENT | Facility: CLINIC | Age: 79
End: 2024-06-12

## 2024-06-14 ENCOUNTER — APPOINTMENT (OUTPATIENT)
Dept: PAIN MANAGEMENT | Facility: CLINIC | Age: 79
End: 2024-06-14

## 2024-06-27 ENCOUNTER — NON-APPOINTMENT (OUTPATIENT)
Age: 79
End: 2024-06-27

## 2024-06-28 ENCOUNTER — APPOINTMENT (OUTPATIENT)
Dept: PAIN MANAGEMENT | Facility: CLINIC | Age: 79
End: 2024-06-28

## 2024-06-28 VITALS
BODY MASS INDEX: 23.41 KG/M2 | WEIGHT: 124 LBS | DIASTOLIC BLOOD PRESSURE: 75 MMHG | HEART RATE: 80 BPM | SYSTOLIC BLOOD PRESSURE: 124 MMHG | HEIGHT: 61 IN

## 2024-06-28 PROCEDURE — 64615 CHEMODENERV MUSC MIGRAINE: CPT

## 2024-06-28 PROCEDURE — 99203 OFFICE O/P NEW LOW 30 MIN: CPT | Mod: 25

## 2024-09-16 ENCOUNTER — APPOINTMENT (OUTPATIENT)
Dept: PAIN MANAGEMENT | Facility: CLINIC | Age: 79
End: 2024-09-16

## 2024-09-30 ENCOUNTER — APPOINTMENT (OUTPATIENT)
Dept: PAIN MANAGEMENT | Facility: CLINIC | Age: 79
End: 2024-09-30

## 2024-09-30 VITALS
DIASTOLIC BLOOD PRESSURE: 72 MMHG | SYSTOLIC BLOOD PRESSURE: 123 MMHG | HEART RATE: 73 BPM | WEIGHT: 125 LBS | BODY MASS INDEX: 23.6 KG/M2 | HEIGHT: 61 IN

## 2024-09-30 DIAGNOSIS — R51.9 HEADACHE, UNSPECIFIED: ICD-10-CM

## 2024-09-30 DIAGNOSIS — G43.701 CHRONIC MIGRAINE W/OUT AURA, NOT INTRACTABLE, WITH STATUS MIGRAINOSUS: ICD-10-CM

## 2024-09-30 PROCEDURE — 99213 OFFICE O/P EST LOW 20 MIN: CPT | Mod: 25

## 2024-09-30 PROCEDURE — 64615 CHEMODENERV MUSC MIGRAINE: CPT

## 2024-09-30 NOTE — PROCEDURE
[Chronic migraine] : Chronic migraine [Consent Signed] : consent signed [BOTOX 200 Units] : BOTOX 200 units; 5 units per muscle site.  procerus x 1, corragator x 2, frontalis x 4, temporalis x 8, occipitalis x 6, cervical  paraspinal x 4 and trapezius x 6 [Adverse Effects] : no adverse effects [FreeTextEntry1] : only 2 in each trapezius + 2 each masseter + 1 frontalis

## 2024-09-30 NOTE — PHYSICAL EXAM
[General Appearance - Alert] : alert [General Appearance - Well Nourished] : well nourished [General Appearance - Well Developed] : well developed [General Appearance - Well-Appearing] : healthy appearing [Oriented To Time, Place, And Person] : oriented to person, place, and time [Impaired Insight] : insight and judgment were intact [Affect] : the affect was normal [Memory Recent] : recent memory was not impaired [Memory Remote] : remote memory was not impaired [Person] : oriented to person [Place] : oriented to place [Time] : oriented to time [Short Term Intact] : short term memory intact [Remote Intact] : remote memory intact [Registration Intact] : recent registration memory intact [Concentration Intact] : normal concentrating ability [Visual Intact] : visual attention was ~T not ~L decreased [Fluency] : fluency intact [Comprehension] : comprehension intact [Current Events] : adequate knowledge of current events [Past History] : adequate knowledge of personal past history [Vocabulary] : adequate range of vocabulary [Cranial Nerves Oculomotor (III)] : extraocular motion intact [Cranial Nerves Facial (VII)] : face symmetrical [Cranial Nerves Vestibulocochlear (VIII)] : hearing was intact bilaterally [Cranial Nerves Accessory (XI - Cranial And Spinal)] : head turning and shoulder shrug symmetric [Cranial Nerves Hypoglossal (XII)] : there was no tongue deviation with protrusion [Motor Strength] : muscle strength was normal in all four extremities [No Muscle Atrophy] : normal bulk in all four extremities [Sensation Tactile Decrease] : light touch was intact [Abnormal Walk] : normal gait [Sclera] : the sclera and conjunctiva were normal [No VERONICA] : no internuclear ophthalmoplegia [Strabismus] : no strabismus was seen [Outer Ear] : the ears and nose were normal in appearance [Hearing Threshold Finger Rub Not [place]] : hearing was normal [Neck Cervical Mass (___cm)] : no neck mass was observed [Exaggerated Use Of Accessory Muscles For Inspiration] : no accessory muscle use [Edema] : there was no peripheral edema [Nail Clubbing] : no clubbing  or cyanosis of the fingernails [Involuntary Movements] : no involuntary movements were seen [Skin Color & Pigmentation] : normal skin color and pigmentation [] : no rash [Motor Strength Upper Extremities Bilaterally] : strength was normal in both upper extremities [Allodynia] : no ~T allodynia present [Tremor] : no tremor present [Dysdiadochokinesia Bilaterally] : not present

## 2024-10-21 ENCOUNTER — APPOINTMENT (OUTPATIENT)
Dept: ENDOCRINOLOGY | Facility: CLINIC | Age: 79
End: 2024-10-21
Payer: MEDICARE

## 2024-10-21 VITALS
WEIGHT: 125 LBS | HEART RATE: 71 BPM | DIASTOLIC BLOOD PRESSURE: 68 MMHG | OXYGEN SATURATION: 98 % | HEIGHT: 60 IN | BODY MASS INDEX: 24.54 KG/M2 | SYSTOLIC BLOOD PRESSURE: 112 MMHG

## 2024-10-21 DIAGNOSIS — M81.0 AGE-RELATED OSTEOPOROSIS W/OUT CURRENT PATHOLOGICAL FRACTURE: ICD-10-CM

## 2024-10-21 DIAGNOSIS — Z87.39 PERSONAL HISTORY OF OTHER DISEASES OF THE MUSCULOSKELETAL SYSTEM AND CONNECTIVE TISSUE: ICD-10-CM

## 2024-10-21 PROCEDURE — G2211 COMPLEX E/M VISIT ADD ON: CPT

## 2024-10-21 PROCEDURE — ZZZZZ: CPT

## 2024-10-21 PROCEDURE — 99213 OFFICE O/P EST LOW 20 MIN: CPT

## 2024-10-21 PROCEDURE — 77080 DXA BONE DENSITY AXIAL: CPT | Mod: GA

## 2024-10-29 ENCOUNTER — APPOINTMENT (OUTPATIENT)
Age: 79
End: 2024-10-29

## 2024-12-16 ENCOUNTER — APPOINTMENT (OUTPATIENT)
Dept: PAIN MANAGEMENT | Facility: CLINIC | Age: 79
End: 2024-12-16

## 2024-12-30 ENCOUNTER — APPOINTMENT (OUTPATIENT)
Dept: PAIN MANAGEMENT | Facility: CLINIC | Age: 79
End: 2024-12-30

## 2024-12-30 VITALS
WEIGHT: 127 LBS | SYSTOLIC BLOOD PRESSURE: 118 MMHG | BODY MASS INDEX: 24.8 KG/M2 | OXYGEN SATURATION: 97 % | DIASTOLIC BLOOD PRESSURE: 75 MMHG | HEART RATE: 77 BPM

## 2024-12-30 PROCEDURE — 64615 CHEMODENERV MUSC MIGRAINE: CPT

## 2024-12-30 PROCEDURE — 99213 OFFICE O/P EST LOW 20 MIN: CPT | Mod: 25

## 2025-03-27 ENCOUNTER — NON-APPOINTMENT (OUTPATIENT)
Age: 80
End: 2025-03-27

## 2025-03-28 ENCOUNTER — APPOINTMENT (OUTPATIENT)
Dept: PAIN MANAGEMENT | Facility: CLINIC | Age: 80
End: 2025-03-28

## 2025-03-28 VITALS
WEIGHT: 126 LBS | HEIGHT: 60 IN | BODY MASS INDEX: 24.74 KG/M2 | HEART RATE: 80 BPM | SYSTOLIC BLOOD PRESSURE: 137 MMHG | DIASTOLIC BLOOD PRESSURE: 75 MMHG

## 2025-03-28 PROCEDURE — 64615 CHEMODENERV MUSC MIGRAINE: CPT

## 2025-03-28 PROCEDURE — 99213 OFFICE O/P EST LOW 20 MIN: CPT | Mod: 25

## 2025-06-26 ENCOUNTER — NON-APPOINTMENT (OUTPATIENT)
Age: 80
End: 2025-06-26

## 2025-06-30 ENCOUNTER — APPOINTMENT (OUTPATIENT)
Dept: PAIN MANAGEMENT | Facility: CLINIC | Age: 80
End: 2025-06-30

## 2025-06-30 VITALS
SYSTOLIC BLOOD PRESSURE: 118 MMHG | WEIGHT: 126 LBS | DIASTOLIC BLOOD PRESSURE: 73 MMHG | HEART RATE: 75 BPM | HEIGHT: 60 IN | BODY MASS INDEX: 24.74 KG/M2

## 2025-06-30 PROCEDURE — 99213 OFFICE O/P EST LOW 20 MIN: CPT | Mod: 25

## 2025-06-30 PROCEDURE — 64615 CHEMODENERV MUSC MIGRAINE: CPT
